# Patient Record
Sex: FEMALE | Race: WHITE | NOT HISPANIC OR LATINO | URBAN - METROPOLITAN AREA
[De-identification: names, ages, dates, MRNs, and addresses within clinical notes are randomized per-mention and may not be internally consistent; named-entity substitution may affect disease eponyms.]

---

## 2017-01-12 ENCOUNTER — ALLSCRIPTS OFFICE VISIT (OUTPATIENT)
Dept: OTHER | Facility: OTHER | Age: 22
End: 2017-01-12

## 2017-01-14 ENCOUNTER — LAB CONVERSION - ENCOUNTER (OUTPATIENT)
Dept: OTHER | Facility: OTHER | Age: 22
End: 2017-01-14

## 2017-01-14 LAB
A. VAGINALIS BY PCR (HISTORICAL): NOT DETECTED
A.VAGINALIS LOG (CELL/ML) (HISTORICAL): <3.25
BVAB 2 (HISTORICAL): DETECTED
C. ALBICANS, DNA OR PCR (HISTORICAL): NOT DETECTED
CANDIDA GENUS (HISTORICAL): NOT DETECTED
CHLAMYDIA TRACHOMATIS BY MOL. METHOD (HISTORICAL): NOT DETECTED
GARDNERELLA BY MOL. METHOD (HISTORICAL): >5.25
GARDNERELLA BY MOL. METHOD (HISTORICAL): DETECTED
GC BY MOL. METHOD (HISTORICAL): NOT DETECTED
LACTOBACILLUS (SPECIES) (HISTORICAL): <3.25
LACTOBACILLUS (SPECIES) (HISTORICAL): NOT DETECTED
MEGASPHAERA TYPE 1 (HISTORICAL): DETECTED
TRICHOMONAS (HISTORICAL): NOT DETECTED
TRICHOMONAS (HISTORICAL): NOT DETECTED

## 2017-01-19 ENCOUNTER — GENERIC CONVERSION - ENCOUNTER (OUTPATIENT)
Dept: OTHER | Facility: OTHER | Age: 22
End: 2017-01-19

## 2017-05-31 ENCOUNTER — ALLSCRIPTS OFFICE VISIT (OUTPATIENT)
Dept: OTHER | Facility: OTHER | Age: 22
End: 2017-05-31

## 2017-05-31 LAB
BACTERIA UR QL AUTO: NEGATIVE
CLUE CELL (HISTORICAL): NEGATIVE
HYPHAL YEAST (HISTORICAL): NEGATIVE
KOH PREP (HISTORICAL): NEGATIVE
PH UR STRIP.AUTO: 4.5 [PH]
TRICHOMONAS (HISTORICAL): NEGATIVE
YEAST (HISTORICAL): NEGATIVE

## 2017-09-27 LAB
A. VAGINALIS BY PCR (HISTORICAL): ABNORMAL
GARDNERELLA BY MOL. METHOD (HISTORICAL): ABNORMAL
GARDNERELLA BY MOL. METHOD (HISTORICAL): ABNORMAL
TRICHOMONAS (HISTORICAL): ABNORMAL
TRICHOMONAS (HISTORICAL): NORMAL

## 2018-01-09 NOTE — MISCELLANEOUS
Message   Recorded as Task   Date: 01/17/2017 03:59 PM, Created By: Kole Conklin   Task Name: Care Coordination   Assigned To: [facility],Team   Regarding Patient: Nayely Rae, Status: In Progress   Comment:    Vannesa Love - 17 Jan 2017 3:59 PM     TASK CREATED  sorry only (+) BV needs metrogel   Alva Rider - 17 Jan 2017 4:23 PM     TASK IN PROGRESS   Alva Rider - 17 Jan 2017 4:28 PM     TASK EDITED  spoke with pt    she is in [employer]  at [facility] 01/18 with complete pharm info so we can send rx for metrogel    pl inform    Ul  Adrianalaura Amina 90 Jan 2017 9:20 AM     TASK EDITED  [facility]   [facility] - 19 Jan 2017 4:48 PM     TASK EDITED  [id number]  [facility] - [facility] pharmacy in store   [facility] 4[facility]52 PM     TASK EDITED  Pt gave me another #[phone] and I called in metrogel vag cr, sig 1 appl in vagina hs x 7  Active Problems    1  Acute vaginitis (616 10) (N76 0)   2  Encounter for gynecological examination without abnormal finding (V72 31) (Z01 419)   3  Screening for STD (sexually transmitted disease) (V74 5) (Z11 3)    Current Meds   1  No Reported Medications Recorded    Allergies    1  No Known Drug Allergies    Signatures   Electronically signed by :  Tatyana Temple, ; Jan 19 2017  4:52PM EST                       (Author)

## 2018-01-13 VITALS — SYSTOLIC BLOOD PRESSURE: 114 MMHG | DIASTOLIC BLOOD PRESSURE: 72 MMHG | WEIGHT: 180 LBS

## 2018-01-13 VITALS
SYSTOLIC BLOOD PRESSURE: 138 MMHG | WEIGHT: 180 LBS | BODY MASS INDEX: 31.89 KG/M2 | HEIGHT: 63 IN | DIASTOLIC BLOOD PRESSURE: 66 MMHG